# Patient Record
Sex: FEMALE | URBAN - METROPOLITAN AREA
[De-identification: names, ages, dates, MRNs, and addresses within clinical notes are randomized per-mention and may not be internally consistent; named-entity substitution may affect disease eponyms.]

---

## 2021-07-19 ENCOUNTER — APPOINTMENT (RX ONLY)
Dept: URBAN - METROPOLITAN AREA CLINIC 55 | Facility: CLINIC | Age: 36
Setting detail: DERMATOLOGY
End: 2021-07-19

## 2021-07-19 DIAGNOSIS — Z41.9 ENCOUNTER FOR PROCEDURE FOR PURPOSES OTHER THAN REMEDYING HEALTH STATE, UNSPECIFIED: ICD-10-CM

## 2021-07-19 PROCEDURE — ? OTHER

## 2021-07-19 PROCEDURE — ? HYDRAFACIAL

## 2021-07-19 ASSESSMENT — LOCATION SIMPLE DESCRIPTION DERM: LOCATION SIMPLE: LEFT FOREHEAD

## 2021-07-19 ASSESSMENT — LOCATION DETAILED DESCRIPTION DERM: LOCATION DETAILED: LEFT MEDIAL FOREHEAD

## 2021-07-19 ASSESSMENT — LOCATION ZONE DERM: LOCATION ZONE: FACE

## 2021-07-19 NOTE — PROCEDURE: HYDRAFACIAL
Additional Vacuum Pressure (Won't Render If 0): 0
Glycolic Acid %: 7.5%
Treatment Number: 1
Consent: Written consent obtained, risks reviewed including but not limited to crusting, scabbing, blistering, scarring, darker or lighter pigmentary change, bruising, and/or incomplete response. Patient given written copy of pre/post care instructions and consent form. Patient given written pre/post care handouts and consent form.
Number Of Passes: 2
Detail Level: Zone
Vacuum Pressure: 12
Indication: skin texture
Post-Care Instructions: I reviewed with the patient in detail post-care instructions. Patient should stay away from the sun and wear sun protection until treated areas are fully healed.
Comments: SIGNATURE

## 2021-09-02 ENCOUNTER — APPOINTMENT (RX ONLY)
Dept: URBAN - METROPOLITAN AREA CLINIC 55 | Facility: CLINIC | Age: 36
Setting detail: DERMATOLOGY
End: 2021-09-02

## 2021-09-02 DIAGNOSIS — Z41.9 ENCOUNTER FOR PROCEDURE FOR PURPOSES OTHER THAN REMEDYING HEALTH STATE, UNSPECIFIED: ICD-10-CM

## 2021-09-02 PROCEDURE — ? OTHER

## 2021-09-02 PROCEDURE — ? HYDRAFACIAL

## 2021-09-02 ASSESSMENT — LOCATION DETAILED DESCRIPTION DERM: LOCATION DETAILED: LEFT MEDIAL FOREHEAD

## 2021-09-02 ASSESSMENT — LOCATION SIMPLE DESCRIPTION DERM: LOCATION SIMPLE: LEFT FOREHEAD

## 2021-09-02 ASSESSMENT — LOCATION ZONE DERM: LOCATION ZONE: FACE

## 2021-09-02 NOTE — PROCEDURE: OTHER
Render Risk Assessment In Note?: no
Note Text (......Xxx Chief Complaint.): This diagnosis correlates with the
Detail Level: Zone
Other (Free Text): Patient states since last visit she has switched to all gentle products from Cetaphil or CeraVe and added in OTC Differin gel a couple times her week. I see a marked improvement in her texture and hydration; encouraged her to stick with this new regimen and could even increase Differin gel to qod if desired.\\n\\nPatient states she would like to schedule a rejuvenating facial for ~6 weeks out and skip the HydraFacial for now since her skin is doing so well.

## 2021-09-02 NOTE — PROCEDURE: HYDRAFACIAL
Vacuum Pressure: 12
Consent: Written consent obtained, risks reviewed including but not limited to crusting, scabbing, blistering, scarring, darker or lighter pigmentary change, bruising, and/or incomplete response. Patient given written copy of pre/post care instructions and consent form. Patient given written pre/post care handouts and consent form.
Detail Level: Zone
Indication: skin texture
Treatment Number: 1
Comments: SIGNATURE
Post-Care Instructions: I reviewed with the patient in detail post-care instructions. Patient should stay away from the sun and wear sun protection until treated areas are fully healed.
Additional Vacuum Pressure (Won't Render If 0): 0
Number Of Passes: 2
Glycolic Acid %: 7.5%

## 2021-10-26 ENCOUNTER — APPOINTMENT (RX ONLY)
Dept: URBAN - METROPOLITAN AREA CLINIC 55 | Facility: CLINIC | Age: 36
Setting detail: DERMATOLOGY
End: 2021-10-26

## 2021-10-26 DIAGNOSIS — Z41.9 ENCOUNTER FOR PROCEDURE FOR PURPOSES OTHER THAN REMEDYING HEALTH STATE, UNSPECIFIED: ICD-10-CM

## 2021-10-26 PROCEDURE — ? FACIAL

## 2021-10-26 ASSESSMENT — LOCATION SIMPLE DESCRIPTION DERM
LOCATION SIMPLE: RIGHT ANTERIOR NECK
LOCATION SIMPLE: LEFT FOREHEAD
LOCATION SIMPLE: CHEST

## 2021-10-26 ASSESSMENT — LOCATION DETAILED DESCRIPTION DERM
LOCATION DETAILED: UPPER STERNUM
LOCATION DETAILED: LEFT MEDIAL FOREHEAD
LOCATION DETAILED: RIGHT INFERIOR ANTERIOR NECK

## 2021-10-26 ASSESSMENT — LOCATION ZONE DERM
LOCATION ZONE: NECK
LOCATION ZONE: FACE
LOCATION ZONE: TRUNK

## 2021-10-26 NOTE — PROCEDURE: FACIAL
Exfoliation Type: enzyme
Detail Level: Zone
Facial Steaming: steamed
Treatment Type Override: rejuvenating facial

## 2021-12-07 ENCOUNTER — APPOINTMENT (RX ONLY)
Dept: URBAN - METROPOLITAN AREA CLINIC 55 | Facility: CLINIC | Age: 36
Setting detail: DERMATOLOGY
End: 2021-12-07

## 2021-12-07 DIAGNOSIS — Z41.9 ENCOUNTER FOR PROCEDURE FOR PURPOSES OTHER THAN REMEDYING HEALTH STATE, UNSPECIFIED: ICD-10-CM

## 2021-12-07 PROCEDURE — ? HYDRAFACIAL

## 2021-12-07 PROCEDURE — ? OTHER

## 2021-12-07 NOTE — PROCEDURE: OTHER
Note Text (......Xxx Chief Complaint.): This diagnosis correlates with the
Detail Level: Zone
Render Risk Assessment In Note?: no
Other (Free Text): Patient states she has been experiencing some dryness with the change in seasons. Treated a dry areas with a little HyrdaGlucan. Is going to add a humidifier to her room to help with dryness as well. Has stopped using Differin Gel due to dryness, advised she could just spot treat the nose to help with blackheads in that area as her skin is looking great overall.

## 2021-12-07 NOTE — PROCEDURE: HYDRAFACIAL
Vacuum Pressure Low Setting (Will Not Render If Set To 0): 0
Tip Override
Tip: Hydropeel Tip, Blue
Tip: Hydropeel Tip, Teal
Post-Care Instructions: I reviewed with the patient in detail post-care instructions. Patient should stay away from the sun and wear sun protection until treated areas are fully healed.
Comments: SIGNATURE—GlySal 7.5% (well tolerated, could increase at next visit if dryness is under control)
Solution: Beta-HD
Tip: Hydropeel Tip, Clear
Location: face
Indication: anti-aging
Consent: Written consent obtained, risks reviewed including but not limited to crusting, scabbing, blistering, scarring, darker or lighter pigmentary change, bruising, and/or incomplete response. Patient given written copy of pre/post care instructions and consent form. Patient given written pre/post care handouts and consent form.
Solution Override
Solution: Activ-4
Solution: GlySal 7.5%

## 2022-01-18 ENCOUNTER — APPOINTMENT (RX ONLY)
Dept: URBAN - METROPOLITAN AREA CLINIC 55 | Facility: CLINIC | Age: 37
Setting detail: DERMATOLOGY
End: 2022-01-18

## 2022-01-18 DIAGNOSIS — Z41.9 ENCOUNTER FOR PROCEDURE FOR PURPOSES OTHER THAN REMEDYING HEALTH STATE, UNSPECIFIED: ICD-10-CM

## 2022-01-18 PROCEDURE — ? OTHER

## 2022-01-18 PROCEDURE — ? HYDRAFACIAL

## 2022-01-18 NOTE — PROCEDURE: HYDRAFACIAL
Solution Override
Solution: GlySal 7.5%
Tip: Hydropeel Tip, Teal
Tip Override
Vacuum Pressure Low Setting (Will Not Render If Set To 0): 0
Tip: Hydropeel Tip, Clear
Consent: Written consent obtained, risks reviewed including but not limited to crusting, scabbing, blistering, scarring, darker or lighter pigmentary change, bruising, and/or incomplete response. Patient given written copy of pre/post care instructions and consent form. Patient given written pre/post care handouts and consent form.
Solution: Activ-4
Comments: SIGNATURE— 7.5% Glysal (well tolerated, could increase next visit if not to dry/not right before wedding)
Indication: anti-aging
Solution: Beta-HD
Post-Care Instructions: I reviewed with the patient in detail post-care instructions. Patient should stay away from the sun and wear sun protection until treated areas are fully healed.
Tip: Hydropeel Tip, Blue
Location: face

## 2022-01-18 NOTE — PROCEDURE: OTHER
Detail Level: Zone
Note Text (......Xxx Chief Complaint.): This diagnosis correlates with the
Render Risk Assessment In Note?: no
Other (Free Text): States her skin is very dry with the change in season and getting a little irritate. Wants skin looking its best for her wedding March 4 2022. Did a test spot of dermaplane to forehead to see if she likes this in preparation of wedding makeup application and also a sample of Hydraglucan booster to aid in hydration; she may add one or both of these to her pre-wedding HF. Is also considering adding an oil to her facial regimen.

## 2022-02-15 ENCOUNTER — APPOINTMENT (RX ONLY)
Dept: URBAN - METROPOLITAN AREA CLINIC 55 | Facility: CLINIC | Age: 37
Setting detail: DERMATOLOGY
End: 2022-02-15

## 2022-02-15 DIAGNOSIS — Z41.9 ENCOUNTER FOR PROCEDURE FOR PURPOSES OTHER THAN REMEDYING HEALTH STATE, UNSPECIFIED: ICD-10-CM

## 2022-02-15 PROCEDURE — ? DERMAPLANE

## 2022-02-15 PROCEDURE — ? FACIAL

## 2022-02-15 ASSESSMENT — LOCATION SIMPLE DESCRIPTION DERM
LOCATION SIMPLE: RIGHT FOREHEAD
LOCATION SIMPLE: SUPERIOR FOREHEAD

## 2022-02-15 ASSESSMENT — LOCATION ZONE DERM: LOCATION ZONE: FACE

## 2022-02-15 ASSESSMENT — LOCATION DETAILED DESCRIPTION DERM
LOCATION DETAILED: RIGHT SUPERIOR FOREHEAD
LOCATION DETAILED: SUPERIOR MID FOREHEAD

## 2022-02-15 NOTE — PROCEDURE: FACIAL
Treatment Type (Optional): Acne Facial
Treatment Type Override: Rejuvenating facial
Comments (Non-Sticky): Patient has extremely sensitive skin. Used Massage oil in place of Skinzyme mask and layer of Lite lotion instead of phytocorrective mask. Also has a patch suspicious for eczema to right chin, which was avoided with treatment today.
Facial Steaming: steamed
Mask Type (Optional): gel based
Detail Level: Zone

## 2022-02-15 NOTE — PROCEDURE: DERMAPLANE
Detail Level: Zone
Treatment Areas: face
Blade: 10R blade scalpel
Treatment Area Prep: alcohol
Pre-Procedure Text: The patient was placed in a recumbant position on the procedure table.
Post-Care Instructions: I reviewed with the patient in detail post-care instructions.

## 2022-02-25 ENCOUNTER — APPOINTMENT (RX ONLY)
Dept: URBAN - METROPOLITAN AREA CLINIC 55 | Facility: CLINIC | Age: 37
Setting detail: DERMATOLOGY
End: 2022-02-25

## 2022-02-25 DIAGNOSIS — Z41.9 ENCOUNTER FOR PROCEDURE FOR PURPOSES OTHER THAN REMEDYING HEALTH STATE, UNSPECIFIED: ICD-10-CM

## 2022-02-25 PROCEDURE — ? FACIAL

## 2022-02-25 ASSESSMENT — LOCATION SIMPLE DESCRIPTION DERM: LOCATION SIMPLE: RIGHT UPPER BACK

## 2022-02-25 ASSESSMENT — LOCATION ZONE DERM: LOCATION ZONE: TRUNK

## 2022-02-25 ASSESSMENT — LOCATION DETAILED DESCRIPTION DERM: LOCATION DETAILED: RIGHT SUPERIOR MEDIAL UPPER BACK

## 2022-02-25 NOTE — PROCEDURE: FACIAL
Comments (Sticky): [employer] Clear applied post facial.
Detail Level: Zone
Comments (Non-Sticky): Cleanse, sensitive peel, Phyto masque and massage.  BACK FACIAL red LED
Treatment Type (Optional): Acne Facial
Mask Type (Optional): calming
Exfoliation Type Override: SC sensitive peel

## 2022-06-25 ENCOUNTER — APPOINTMENT (RX ONLY)
Dept: URBAN - METROPOLITAN AREA CLINIC 55 | Facility: CLINIC | Age: 37
Setting detail: DERMATOLOGY
End: 2022-06-25

## 2022-06-25 DIAGNOSIS — Z41.9 ENCOUNTER FOR PROCEDURE FOR PURPOSES OTHER THAN REMEDYING HEALTH STATE, UNSPECIFIED: ICD-10-CM

## 2022-06-25 PROCEDURE — ? OTHER

## 2022-06-25 PROCEDURE — ? HYDRAFACIAL

## 2022-06-25 NOTE — PROCEDURE: HYDRAFACIAL
Solution Override
Vacuum Pressure High Setting (Will Not Render If Set To 0): 0
Tip: Hydropeel Tip, Teal
Number Of Passes Step 2: 1
Procedure: Fusion
Solution: Activ-4
Tip Override
Solution: GlySal 7.5%
Solution: Beta-HD
Comments: SIGNATURE— 7.5% Glysal (well tolerated, could increase next visit if not to dry/not right before wedding)
Tip: Hydropeel Tip, Clear
Tip: Hydropeel Tip, Blue
Consent: Written consent obtained, risks reviewed including but not limited to crusting, scabbing, blistering, scarring, darker or lighter pigmentary change, bruising, and/or incomplete response.
Procedure: Extraction
Location: face
Solution: Antiox-6
Post-Care Instructions: I reviewed with the patient in detail post-care instructions. Patient should stay away from the sun and wear sun protection until treated areas are fully healed.
Procedure: Peel
Procedure: Extend and Protect
Indication: prominent pores
Procedure: Exfoliation
Consent: Written consent obtained, risks reviewed including but not limited to crusting, scabbing, blistering, scarring, darker or lighter pigmentary change, bruising, and/or incomplete response. Patient given written copy of pre/post care instructions and consent form. Patient given written pre/post care handouts and consent form.
Indication: anti-aging

## 2022-06-25 NOTE — PROCEDURE: OTHER
Note Text (......Xxx Chief Complaint.): This diagnosis correlates with the
Other (Free Text): States her skin is very dry with the change in season and getting a little irritate. Wants skin looking its best for her wedding March 4 2022. Did a test spot of dermaplane to forehead to see if she likes this in preparation of wedding makeup application and also a sample of Hydraglucan booster to aid in hydration; she may add one or both of these to her pre-wedding HF. Is also considering adding an oil to her facial regimen.
Detail Level: Zone
Render Risk Assessment In Note?: no

## 2022-08-02 ENCOUNTER — APPOINTMENT (RX ONLY)
Dept: URBAN - METROPOLITAN AREA CLINIC 55 | Facility: CLINIC | Age: 37
Setting detail: DERMATOLOGY
End: 2022-08-02

## 2022-08-02 DIAGNOSIS — Z41.9 ENCOUNTER FOR PROCEDURE FOR PURPOSES OTHER THAN REMEDYING HEALTH STATE, UNSPECIFIED: ICD-10-CM

## 2022-08-02 PROCEDURE — ? SIGNATURE HYDRAFACIAL

## 2022-08-02 PROCEDURE — ? INVENTORY

## 2022-08-02 NOTE — PROCEDURE: SIGNATURE HYDRAFACIAL
Vacuum Pressure High Setting (Will Not Render If Set To 0): 0
Tip: Hydropeel Tip, Teal
Location: face
Procedure: Peel
Tip: Hydropeel Tip, Clear
Solution Override
Solution: Activ-4
Procedure: Boost
Solution: GlySal 7.5%
Treatment Number: 1
Procedure: Extraction
Tip: Hydropeel Tip, Blue
Procedure: Fusion
Tip Override
Indication: acne
Consent: Written consent obtained, risks reviewed including but not limited to crusting, scabbing, blistering, scarring, darker or lighter pigmentary change, bruising, and/or incomplete response.
Solution: Beta-HD
Procedure: Extend and Protect
Post-Care Instructions: I reviewed with the patient in detail post-care instructions. Patient should stay away from the sun and wear sun protection until treated areas are fully healed.
Procedure: Exfoliation

## 2022-09-17 ENCOUNTER — APPOINTMENT (RX ONLY)
Dept: URBAN - METROPOLITAN AREA CLINIC 55 | Facility: CLINIC | Age: 37
Setting detail: DERMATOLOGY
End: 2022-09-17

## 2022-09-17 DIAGNOSIS — Z41.9 ENCOUNTER FOR PROCEDURE FOR PURPOSES OTHER THAN REMEDYING HEALTH STATE, UNSPECIFIED: ICD-10-CM

## 2022-09-17 PROCEDURE — ? INVENTORY

## 2022-09-17 PROCEDURE — ? SIGNATURE HYDRAFACIAL

## 2022-09-17 ASSESSMENT — LOCATION SIMPLE DESCRIPTION DERM: LOCATION SIMPLE: GLABELLA

## 2022-09-17 ASSESSMENT — LOCATION ZONE DERM: LOCATION ZONE: FACE

## 2022-09-17 ASSESSMENT — LOCATION DETAILED DESCRIPTION DERM: LOCATION DETAILED: GLABELLA

## 2022-09-17 NOTE — PROCEDURE: SIGNATURE HYDRAFACIAL
Solution: Activ-4
Vacuum Pressure High Setting (Will Not Render If Set To 0): 0
Procedure: Peel
Solution Override
Procedure: Extend and Protect
Tip: Hydropeel Tip, Teal
Procedure: Fusion
Consent: Written consent obtained, risks reviewed including but not limited to crusting, scabbing, blistering, scarring, darker or lighter pigmentary change, bruising, and/or incomplete response.
Tip: Hydropeel Tip, Clear
Post-Care Instructions: I reviewed with the patient in detail post-care instructions. Patient should stay away from the sun and wear sun protection until treated areas are fully healed.
Solution: GlySal 7.5%
Treatment Number: 1
Procedure: Extraction
Tip: Hydropeel Tip, Blue
Indication: acne scarring
Solution: Beta-HD
Tip Override
Procedure: Exfoliation
Procedure: Boost
Location: face

## 2022-10-22 ENCOUNTER — APPOINTMENT (RX ONLY)
Dept: URBAN - METROPOLITAN AREA CLINIC 55 | Facility: CLINIC | Age: 37
Setting detail: DERMATOLOGY
End: 2022-10-22

## 2022-10-22 DIAGNOSIS — Z41.9 ENCOUNTER FOR PROCEDURE FOR PURPOSES OTHER THAN REMEDYING HEALTH STATE, UNSPECIFIED: ICD-10-CM

## 2022-10-22 PROCEDURE — ? INVENTORY

## 2022-10-22 PROCEDURE — ? SIGNATURE HYDRAFACIAL

## 2022-10-22 ASSESSMENT — LOCATION ZONE DERM: LOCATION ZONE: FACE

## 2022-10-22 ASSESSMENT — LOCATION DETAILED DESCRIPTION DERM: LOCATION DETAILED: GLABELLA

## 2022-10-22 ASSESSMENT — LOCATION SIMPLE DESCRIPTION DERM: LOCATION SIMPLE: GLABELLA

## 2022-10-22 NOTE — PROCEDURE: SIGNATURE HYDRAFACIAL
Location: face
Tip: Hydropeel Tip, Teal
Vacuum Pressure Low Setting (Will Not Render If Set To 0): 0
Tip: Hydropeel Tip, Clear
Procedure: Peel
Solution: Activ-4
Procedure: Fusion
Tip Override
Procedure: Boost
Procedure: Extraction
Solution: GlySal 7.5%
Treatment Number: 1
Consent: Written consent obtained, risks reviewed including but not limited to crusting, scabbing, blistering, scarring, darker or lighter pigmentary change, bruising, and/or incomplete response.
Procedure: Extend and Protect
Solution Override
Post-Care Instructions: I reviewed with the patient in detail post-care instructions. Patient should stay away from the sun and wear sun protection until treated areas are fully healed.
Indication: dehydrated skin
Tip: Hydropeel Tip, Blue
Solution: Beta-HD
Procedure: Exfoliation

## 2023-07-07 ENCOUNTER — APPOINTMENT (RX ONLY)
Dept: URBAN - METROPOLITAN AREA CLINIC 55 | Facility: CLINIC | Age: 38
Setting detail: DERMATOLOGY
End: 2023-07-07

## 2023-07-07 DIAGNOSIS — Z41.9 ENCOUNTER FOR PROCEDURE FOR PURPOSES OTHER THAN REMEDYING HEALTH STATE, UNSPECIFIED: ICD-10-CM

## 2023-07-07 PROCEDURE — ? INVENTORY

## 2023-07-07 PROCEDURE — ? HYDRAFACIAL

## 2023-07-07 ASSESSMENT — LOCATION ZONE DERM: LOCATION ZONE: FACE

## 2023-07-07 ASSESSMENT — LOCATION SIMPLE DESCRIPTION DERM: LOCATION SIMPLE: RIGHT FOREHEAD

## 2023-07-07 ASSESSMENT — LOCATION DETAILED DESCRIPTION DERM: LOCATION DETAILED: RIGHT INFERIOR MEDIAL FOREHEAD

## 2023-07-07 NOTE — PROCEDURE: HYDRAFACIAL
Vacuum Pressure High Setting (Will Not Render If Set To 0): 0
Solution Override
Tip Override
Post-Care Instructions: I reviewed with the patient in detail post-care instructions. Patient should stay away from the sun and wear sun protection until treated areas are fully healed.
Location: face
Consent: Written consent obtained, risks reviewed including but not limited to crusting, scabbing, blistering, scarring, darker or lighter pigmentary change, bruising, and/or incomplete response.
Indication: skin texture
Comments: Signature HF, blue tip, peel prep, 10 min red led. Nectar

## 2023-09-08 ENCOUNTER — APPOINTMENT (RX ONLY)
Dept: URBAN - METROPOLITAN AREA CLINIC 55 | Facility: CLINIC | Age: 38
Setting detail: DERMATOLOGY
End: 2023-09-08

## 2023-09-08 DIAGNOSIS — Z41.9 ENCOUNTER FOR PROCEDURE FOR PURPOSES OTHER THAN REMEDYING HEALTH STATE, UNSPECIFIED: ICD-10-CM

## 2023-09-08 PROCEDURE — ? HYDRAFACIAL

## 2023-09-08 PROCEDURE — ? INVENTORY

## 2023-09-08 ASSESSMENT — LOCATION DETAILED DESCRIPTION DERM: LOCATION DETAILED: INFERIOR MID FOREHEAD

## 2023-09-08 ASSESSMENT — LOCATION SIMPLE DESCRIPTION DERM: LOCATION SIMPLE: INFERIOR FOREHEAD

## 2023-09-08 ASSESSMENT — LOCATION ZONE DERM: LOCATION ZONE: FACE

## 2023-09-08 NOTE — PROCEDURE: HYDRAFACIAL
Vacuum Pressure High Setting (Will Not Render If Set To 0): 0
Tip Override
Location: face
Solution Override
Post-Care Instructions: I reviewed with the patient in detail post-care instructions. Patient should stay away from the sun and wear sun protection until treated areas are fully healed.
Consent: Written consent obtained, risks reviewed including but not limited to crusting, scabbing, blistering, scarring, darker or lighter pigmentary change, bruising, and/or incomplete response.
Indication: skin texture
Comments: Signature HF, blue tip, peel prep, manual extractions on nose. 10 min red led

## 2024-08-21 ENCOUNTER — APPOINTMENT (RX ONLY)
Dept: URBAN - METROPOLITAN AREA CLINIC 55 | Facility: CLINIC | Age: 39
Setting detail: DERMATOLOGY
End: 2024-08-21

## 2024-08-21 DIAGNOSIS — Z41.9 ENCOUNTER FOR PROCEDURE FOR PURPOSES OTHER THAN REMEDYING HEALTH STATE, UNSPECIFIED: ICD-10-CM

## 2024-08-21 PROCEDURE — ? INVENTORY

## 2024-08-21 PROCEDURE — ? FACIAL

## 2024-08-21 ASSESSMENT — LOCATION ZONE DERM: LOCATION ZONE: FACE

## 2024-08-21 ASSESSMENT — LOCATION DETAILED DESCRIPTION DERM: LOCATION DETAILED: INFERIOR MID FOREHEAD

## 2024-08-21 ASSESSMENT — LOCATION SIMPLE DESCRIPTION DERM: LOCATION SIMPLE: INFERIOR FOREHEAD

## 2024-08-21 NOTE — PROCEDURE: FACIAL
Extraction Method: comedone extractor
Pt is requesting to be seen Friday instead of tomorrow is this ok with her last level being so low?   
Mask Type (Optional): sensitive
Comments (Sticky): Face massaged with Phyto corrective masque and removed with cool towel. Applied [place] and [employer] spf.
Detail Level: Zone
Exfoliation Type Override: JM enzyme masque